# Patient Record
Sex: MALE | Race: WHITE | Employment: UNEMPLOYED | URBAN - NONMETROPOLITAN AREA
[De-identification: names, ages, dates, MRNs, and addresses within clinical notes are randomized per-mention and may not be internally consistent; named-entity substitution may affect disease eponyms.]

---

## 2019-08-03 ENCOUNTER — HOSPITAL ENCOUNTER (EMERGENCY)
Facility: OTHER | Age: 59
Discharge: ANOTHER HOSPITAL | End: 2019-08-03
Attending: FAMILY MEDICINE | Admitting: FAMILY MEDICINE
Payer: COMMERCIAL

## 2019-08-03 ENCOUNTER — APPOINTMENT (OUTPATIENT)
Dept: GENERAL RADIOLOGY | Facility: OTHER | Age: 59
End: 2019-08-03
Attending: FAMILY MEDICINE
Payer: COMMERCIAL

## 2019-08-03 ENCOUNTER — TRANSFERRED RECORDS (OUTPATIENT)
Dept: HEALTH INFORMATION MANAGEMENT | Facility: OTHER | Age: 59
End: 2019-08-03

## 2019-08-03 ENCOUNTER — HOSPITAL ENCOUNTER (EMERGENCY)
Facility: OTHER | Age: 59
Discharge: ANOTHER HOSPITAL | End: 2019-08-03
Admitting: FAMILY MEDICINE
Payer: COMMERCIAL

## 2019-08-03 VITALS
RESPIRATION RATE: 20 BRPM | SYSTOLIC BLOOD PRESSURE: 151 MMHG | OXYGEN SATURATION: 97 % | HEIGHT: 70 IN | BODY MASS INDEX: 31.92 KG/M2 | DIASTOLIC BLOOD PRESSURE: 97 MMHG | TEMPERATURE: 97.5 F | HEART RATE: 72 BPM | WEIGHT: 223 LBS

## 2019-08-03 VITALS
HEIGHT: 70 IN | SYSTOLIC BLOOD PRESSURE: 130 MMHG | HEART RATE: 69 BPM | BODY MASS INDEX: 31.78 KG/M2 | RESPIRATION RATE: 11 BRPM | DIASTOLIC BLOOD PRESSURE: 78 MMHG | OXYGEN SATURATION: 98 % | WEIGHT: 222 LBS

## 2019-08-03 DIAGNOSIS — T17.908A ASPIRATION OF FOREIGN BODY, INITIAL ENCOUNTER: ICD-10-CM

## 2019-08-03 PROCEDURE — 99285 EMERGENCY DEPT VISIT HI MDM: CPT | Mod: 25 | Performed by: FAMILY MEDICINE

## 2019-08-03 PROCEDURE — 99285 EMERGENCY DEPT VISIT HI MDM: CPT | Performed by: FAMILY MEDICINE

## 2019-08-03 PROCEDURE — 74019 RADEX ABDOMEN 2 VIEWS: CPT | Mod: TC

## 2019-08-03 PROCEDURE — 71045 X-RAY EXAM CHEST 1 VIEW: CPT | Mod: TC

## 2019-08-03 PROCEDURE — 99285 EMERGENCY DEPT VISIT HI MDM: CPT | Mod: Z6 | Performed by: FAMILY MEDICINE

## 2019-08-03 ASSESSMENT — ENCOUNTER SYMPTOMS
STRIDOR: 1
APNEA: 0
SHORTNESS OF BREATH: 1
CHOKING: 0
NEUROLOGICAL NEGATIVE: 1
CHEST TIGHTNESS: 0
GASTROINTESTINAL NEGATIVE: 1
CONSTITUTIONAL NEGATIVE: 1
MUSCULOSKELETAL NEGATIVE: 1
COUGH: 1
WHEEZING: 0
NERVOUS/ANXIOUS: 1

## 2019-08-03 ASSESSMENT — MIFFLIN-ST. JEOR
SCORE: 1862.77
SCORE: 1838.24

## 2019-08-03 NOTE — ED NOTES
Pt denies pain, but cringes when deep breathes or coughs.  Cooperative. EMS arrives to transfer pt

## 2019-08-03 NOTE — ED NOTES
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

## 2019-08-03 NOTE — ED NOTES
Contacted mother, Mariana at [phone] and let her know that he will be going to [facility].

## 2019-08-03 NOTE — ED PROVIDER NOTES
"  History   No chief complaint on file.    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 Patient states had tack in mouth and accidentally inhaled it.  Patient had immediate hemoptysis and pain . Very anxious upon EMS arrival Received 3 mg versed. Direct visualization byE MS attempted but only found copious blood in the trachea.  Upon arrival patient groggy from versed. Unable to exhale .     Allergies:  No Known Allergies    Problem List:    There are no active problems to display for this patient.       Past Medical History:    No past medical history on file.    Past Surgical History:    No past surgical history on file.    Family History:    No family history on file.    Social History:  Marital Status:  Single [1]  Social History     Tobacco Use     Smoking status: Not on file   Substance Use Topics     Alcohol use: Not on file     Drug use: Not on file        Medications:      No current outpatient medications on file.      Review of Systems   Constitutional: Negative.    Respiratory: Positive for cough, shortness of breath and stridor. Negative for apnea, choking, chest tightness and wheezing.    Gastrointestinal: Negative.    Musculoskeletal: Negative.    Neurological: Negative.    Psychiatric/Behavioral: The patient is nervous/anxious.        Physical Exam   BP: 123/87  Pulse: 69  Heart Rate: 68  Resp: 11  Height: 177.8 cm (5' 10\")  Weight: 100.7 kg (222 lb)  SpO2: 98 %      Physical Exam   Constitutional: He is oriented to person, place, and time. He appears well-developed and well-nourished. No distress.   Neck: No JVD present. No tracheal deviation present.   Cardiovascular: Normal rate and regular rhythm.   Pulmonary/Chest: No respiratory distress. He has wheezes.   Marked diminished breath sounds right with tight wheeze. Patient unable to exhale .    Abdominal: Soft. Bowel sounds are normal. "   Lymphadenopathy:     He has no cervical adenopathy.   Neurological: He is alert and oriented to person, place, and time.   Skin: Skin is warm and dry. He is not diaphoretic.   Nursing note and vitals reviewed.      ED Course        Procedures          Patient presents to ER with probable foreign body aspiration. Patient works    in construction and accidentally aspirated a tack . Patient agitated on EMS arrival with episode of hemoptysis. Versed given. Direct visualization showing blood in trachea but no foreign body. Patient triaged to exam room. Patient anxious in appearing . Shallow breathing . Pain localized to mid upper chest . Patient without hypoxia or accessory muscle use. NO audible stridor. Oxygen placed for oximetry. Initial portable xray chest and abdomen unable to visualize radioopaque foreign body . Given patient symptoms despite negative xray need to assume foreign body in airway , suspect near right mainstem bronchus. Consult with our surgeon DR Olivares who does not have means for bronchoscopy . Patient clinically stable so will transfer to [facility] accepting physician DR Diaz as pulmonology is available there to do bronchoscopy as needed. Patient understands indication for transfer. Patient transferred by EMS in stable condition .   Results for orders placed or performed during the hospital encounter of 08/03/19   XR Chest Port 1 View    Narrative    EXAM:   XR Chest, 1 View     EXAM DATE/TIME:   8/3/2019 1:45 AM     CLINICAL HISTORY:   59 years old, male; Other: Forgein body; Additional info: SOB - patient had a   thumb tack in his mouth and accidentally swallowed it. Patient states he feels   it in his throat. Patient states he was trying to remove it by sticking a   twizers in this throat. EMS states that they did do a scope and he does have   blood in his trachea.     TECHNIQUE:   Imaging protocol: XR of the chest, 1 view.     COMPARISON:   No relevant prior studies available.     FINDINGS:    Tubes, catheters and devices: Overlying EKG leads.   Lungs: Poor inspiratory effort with somewhat diminished lung volumes. No acute   focal dense air space consolidation or lung parenchymal mass.   Pleural space: No pneumothorax. No large right pleural effusion. No large left   pleural effusion.   Heart/Mediastinum: Unremarkable cardiac silhouette. No mediastinal mass. No   abnormal radiopaque foreign body.  Bones/joints: No acute fracture or neoplastic osseous lesion.       Impression    IMPRESSION:   1. No active cardiopulmonary disaese.   2. No abnormal radiopaque foreign body.  Remainder of findings described as above.    THIS DOCUMENT HAS BEEN ELECTRONICALLY SIGNED BY SIMRAN HOOD MD   XR Abdomen 2 Views    Narrative    EXAM:   XR Abdomen, 2 Views     EXAM DATE/TIME:   8/3/2019 1:47 AM     CLINICAL HISTORY:   59 years old, male; Other: Forgein body; Additional info: Foreign body- patient   had a thumb tack in his mouth and accidentally swallowed it. Patient states he   feels it in his throat. Patient states he was trying to remove it by sticking a   twizers in this throat. EMS states that they did do a scope and he does have   blood in his trachea.     TECHNIQUE:   Imaging protocol: Frontal view of the abdomen/pelvis with upright view of the   abdomen.     COMPARISON:   No relevant prior studies available.     FINDINGS: Overlying EKG leads.  Gastrointestinal tract: Nonspecific bowel gas pattern present. No bowel   obstruction. No bowel wall pneumatosis.   Intraperitoneal space: No pneumoperitoneum.   Organs: No abnormal mass or organomegaly suspected.   Bones/joints: Lumbar spine levoscoliosis. No acute fracture or neoplastic   osseous lesion.   Soft tissues: No abnormal radiopaque foreign body.       Impression    IMPRESSION:   1. No abnormal radiopaque foreign body.  2. Remainder of findings described as above.     THIS DOCUMENT HAS BEEN ELECTRONICALLY SIGNED BY SIMRAN HOOD MD      No results found for  this or any previous visit (from the past 24 hour(s)).    Medications - No data to display    Assessments & Plan (with Medical Decision Making)     I have reviewed the nursing notes.    I have reviewed the findings, diagnosis, plan and need for follow up with the patient.       Medication List      There are no discharge medications for this visit.         Final diagnoses:   Aspiration of foreign body, initial encounter       8/3/2019   [facility]     Glory Reis MD  08/03/19 0244       Glory Reis MD  08/03/19 0244

## 2019-08-03 NOTE — ED TRIAGE NOTES
Patient had a thumb tack in his mouth and accidentally swallowed it.  Patient states he feels it in his throat.  Patient states he was trying to remove it by sticking a twizers in this throat.  EMS states that they did do a scope and he does have blood in his trachea.  Patient was very anxious so they did give versed 3 mg

## 2019-08-06 NOTE — ED PROVIDER NOTES
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 Immediate hemoptysis  Pain localizing to upper sternum. SOB . When EMS arrived patient very agitated . Versed 2 mg given . ATtempt for direct visualization showing blood in trachea only .     Allergies:  Allergies no known allergies    Problem List:    There are no active problems to display for this patient.       Past Medical History:    History reviewed. No pertinent past medical history.    Past Surgical History:    History reviewed. No pertinent surgical history.    Family History:    History reviewed. No pertinent family history.    Social History:  Marital Status:    Social History     Tobacco Use     Smoking status: Never Smoker     Smokeless tobacco: Never Used   Substance Use Topics     Alcohol use: None     Drug use: None        Medications:      No current outpatient medications on file.      Review of Systems   Unable to perform ROS: Acuity of condition       Physical Exam   BP: (!) 151/97  Pulse: 72  Temp: 97.5  F (36.4  C)  Resp: 20  Height: 177.8 cm (5' 10\")  Weight: 101.2 kg (223 lb)  SpO2: 97 %      Physical Exam   Constitutional: He is oriented to person, place, and time. He appears well-developed and well-nourished. He appears distressed.   Anxious appearing male    HENT:   Head: Normocephalic and atraumatic.   Eyes: Pupils are equal, round, and reactive to light.   Neck: Normal range of motion. Neck supple. No tracheal deviation present.   Cardiovascular: Normal rate, regular rhythm and normal heart sounds.   Pulmonary/Chest: No stridor. He is in respiratory distress. He has wheezes. He has no rales.   Patient with shallow respirations. Inability to exhale without cough. Right lung field tight expiratory " wheeze    Abdominal: Soft. Bowel sounds are normal. He exhibits no distension and no mass. There is no tenderness. There is no rebound and no guarding.   Neurological: He is alert and oriented to person, place, and time.   Skin: Skin is warm. Capillary refill takes less than 2 seconds.   Psychiatric:   Anxious male    Nursing note and vitals reviewed.      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 Patient with immediate hemoptysis but no hemoptysis upon arrival to ER . Patient very agitated with first EMS encounter, Versed given in route with some improvement. On initial exam patient somewhat restless complaining of upper sternal discomfort. SOB. Vital signs without significant hypoxia but patient with tight expiratory wheeze and decreased breath sounds right side. Unable to exhale without cough. Xrays not showing any radioopaque foreign body but given exam and history need to suspect foreign body in airway . Consult with our surgeon DR Olivares who does not have capability for bronchoscopy  . Consult to [facility], pulmonology available. Patient currently stable . Patient transferred to [facility] by EMS for further evaluation and management of probable foreign body in airway .   No results found for this or any previous visit.        No results found for this or any previous visit (from the past 24 hour(s)).    Medications - No data to display    Assessments & Plan (with Medical Decision Making)     I have reviewed the nursing notes.    I have reviewed the findings, diagnosis, plan and need for follow up with the patient.         Medication List      There are no discharge medications for this visit.         Final diagnoses:   Aspiration of foreign body, initial encounter       8/3/2019   [facility] Glory Bishop MD  08/05/19 1937

## 2020-01-14 ENCOUNTER — HOSPITAL ENCOUNTER (EMERGENCY)
Facility: OTHER | Age: 60
Discharge: HOME | End: 2020-01-14
Attending: FAMILY MEDICINE
Payer: COMMERCIAL

## 2020-01-14 VITALS
BODY MASS INDEX: 33.52 KG/M2 | DIASTOLIC BLOOD PRESSURE: 94 MMHG | TEMPERATURE: 97.3 F | RESPIRATION RATE: 22 BRPM | WEIGHT: 234.13 LBS | SYSTOLIC BLOOD PRESSURE: 168 MMHG | HEIGHT: 70 IN | OXYGEN SATURATION: 98 %

## 2020-01-14 RX ORDER — ALBUTEROL SULFATE 90 UG/1
2 AEROSOL, METERED RESPIRATORY (INHALATION)
COMMUNITY
Start: 2017-12-22

## 2020-01-14 RX ORDER — IPRATROPIUM BROMIDE AND ALBUTEROL SULFATE 2.5; .5 MG/3ML; MG/3ML
3 SOLUTION RESPIRATORY (INHALATION)
COMMUNITY
Start: 2017-12-22

## 2020-01-14 RX ORDER — GABAPENTIN 300 MG/1
300 CAPSULE ORAL
COMMUNITY
Start: 2015-11-24

## 2020-01-14 RX ORDER — NAPROXEN 500 MG/1
500 TABLET ORAL
COMMUNITY
Start: 2016-12-16 | End: 2020-01-14

## 2020-01-14 RX ORDER — IBUPROFEN 400 MG/1
400 TABLET, FILM COATED ORAL
COMMUNITY
Start: 2017-12-22

## 2020-01-14 RX ORDER — ASCORBIC ACID 500 MG
500 TABLET ORAL
COMMUNITY

## 2020-01-14 ASSESSMENT — MIFFLIN-ST. JEOR: SCORE: 1893.25

## 2020-01-15 NOTE — ED TRIAGE NOTES
Pt reports he's really sick, he's had a cold for about 2 weeks now and he's not getting better.  Hx of hernia surgery and he can feel his hernia coming out so he's wearing a belt, also has back pain from coughing, feels like he's tearing muscles.  He hasn't been sleeping.

## 2020-01-23 ENCOUNTER — HOSPITAL ENCOUNTER (EMERGENCY)
Facility: OTHER | Age: 60
Discharge: HOME | End: 2020-01-23
Attending: EMERGENCY MEDICINE | Admitting: EMERGENCY MEDICINE
Payer: COMMERCIAL

## 2020-01-23 VITALS
WEIGHT: 235 LBS | RESPIRATION RATE: 18 BRPM | TEMPERATURE: 98.6 F | HEIGHT: 70 IN | BODY MASS INDEX: 33.64 KG/M2 | SYSTOLIC BLOOD PRESSURE: 172 MMHG | OXYGEN SATURATION: 96 % | HEART RATE: 77 BPM | DIASTOLIC BLOOD PRESSURE: 91 MMHG

## 2020-01-23 DIAGNOSIS — S61.216A LACERATION OF RIGHT LITTLE FINGER WITH TENDON INVOLVEMENT: ICD-10-CM

## 2020-01-23 DIAGNOSIS — S61.214A LACERATION OF RIGHT RING FINGER WITH TENDON INVOLVEMENT, INITIAL ENCOUNTER: ICD-10-CM

## 2020-01-23 PROCEDURE — 99283 EMERGENCY DEPT VISIT LOW MDM: CPT | Mod: 25 | Performed by: EMERGENCY MEDICINE

## 2020-01-23 PROCEDURE — 25000125 ZZHC RX 250: Performed by: EMERGENCY MEDICINE

## 2020-01-23 PROCEDURE — 12002 RPR S/N/AX/GEN/TRNK2.6-7.5CM: CPT | Mod: Z6 | Performed by: EMERGENCY MEDICINE

## 2020-01-23 PROCEDURE — 12002 RPR S/N/AX/GEN/TRNK2.6-7.5CM: CPT | Performed by: EMERGENCY MEDICINE

## 2020-01-23 PROCEDURE — 99283 EMERGENCY DEPT VISIT LOW MDM: CPT | Mod: Z6 | Performed by: EMERGENCY MEDICINE

## 2020-01-23 RX ORDER — CEPHALEXIN 500 MG/1
500 CAPSULE ORAL 4 TIMES DAILY
Qty: 30 CAPSULE | Refills: 0 | Status: SHIPPED | OUTPATIENT
Start: 2020-01-23 | End: 2020-01-28

## 2020-01-23 RX ORDER — LIDOCAINE HYDROCHLORIDE 10 MG/ML
10 INJECTION, SOLUTION INFILTRATION; PERINEURAL ONCE
Status: COMPLETED | OUTPATIENT
Start: 2020-01-23 | End: 2020-01-23

## 2020-01-23 RX ADMIN — LIDOCAINE HYDROCHLORIDE 10 ML: 10 INJECTION, SOLUTION EPIDURAL; INFILTRATION; INTRACAUDAL; PERINEURAL at 22:10

## 2020-01-23 ASSESSMENT — MIFFLIN-ST. JEOR: SCORE: 1897.2

## 2020-01-23 NOTE — ED AVS SNAPSHOT
[facility]  [address]  [place] [address]  Phone:  [phone]  Fax:  [phone]                                    Varghese Fournier   MRN: [id number]    Department:  [facility]   Date of Visit:  1/23/2020           After Visit Summary Signature Page    I have received my discharge instructions, and my questions have been answered. I have discussed any challenges I see with this plan with the nurse or doctor.    ..........................................................................................................................................  Patient/Patient Representative Signature      ..........................................................................................................................................  Patient Representative Print Name and Relationship to Patient    ..................................................               ................................................  Date                                   Time    ..........................................................................................................................................  Reviewed by Signature/Title    ...................................................              ..............................................  Date                                               Time          22EPIC Rev 08/18

## 2020-01-24 ENCOUNTER — OFFICE VISIT (OUTPATIENT)
Dept: ORTHOPEDICS | Facility: OTHER | Age: 60
End: 2020-01-24
Attending: EMERGENCY MEDICINE
Payer: COMMERCIAL

## 2020-01-24 DIAGNOSIS — S61.216A LACERATION OF RIGHT LITTLE FINGER WITH TENDON INVOLVEMENT: ICD-10-CM

## 2020-01-24 DIAGNOSIS — S61.214A LACERATION OF RIGHT RING FINGER WITH TENDON INVOLVEMENT, INITIAL ENCOUNTER: ICD-10-CM

## 2020-01-24 PROCEDURE — G0463 HOSPITAL OUTPT CLINIC VISIT: HCPCS

## 2020-01-24 NOTE — ED PROVIDER NOTES
[facility]  Emergency Department Visit Note    Hand Injury      History of Present Illness     HPI:  Varghese Fournier is a 59 year old male presenting with laceration of his right ring and little fingers. This was sustained 2 hours ago. The mechanism of injury was cut on a knife. The patient has has low suspicion of foreign body in the wound. The wound was cleaned thoroughly prior to presentation to the emergency department. Tetanus is up to date. There is no numbness, tingling, weakness.    Medications:  Prior to Admission medications    Medication Sig Last Dose Taking? Auth Provider   cephALEXin (KEFLEX) 500 MG capsule Take 1 capsule (500 mg) by mouth 4 times daily for 5 days  Yes Sharon Dobson MD   albuterol (PROAIR HFA/PROVENTIL HFA/VENTOLIN HFA) 108 (90 Base) MCG/ACT inhaler Inhale 2 puffs into the lungs   Reported, Patient   gabapentin (NEURONTIN) 300 MG capsule Take 300 mg by mouth   Reported, Patient   ibuprofen (ADVIL/MOTRIN) 400 MG tablet Take 400 mg by mouth   Reported, Patient   ipratropium - albuterol 0.5 mg/2.5 mg/3 mL (DUONEB) 0.5-2.5 (3) MG/3ML neb solution 3 mLs   Reported, Patient   vitamin C (ASCORBIC ACID) 500 MG tablet Take 500 mg by mouth   Reported, Patient       Allergies:  No Known Allergies    Problem List:  There is no problem list on file for this patient.      Past Medical History:  History reviewed. No pertinent past medical history.    Past Surgical History:  History reviewed. No pertinent surgical history.    Social History:  Social History     Tobacco Use     Smoking status: Former Smoker     Smokeless tobacco: Former User   Substance Use Topics     Alcohol use: Not Currently     Drug use: None       Review of Systems:  10 point review of systems obtained and pertinent positive and negative findings noted in HPI. Review of systems otherwise negative.      Physical Exam     Vital signs: BP (!) 172/91   Pulse 77   Temp 98.6  F (37  C) (Tympanic)   Resp  "18   Ht 1.778 m (5' 10\")   Wt 106.6 kg (235 lb)   SpO2 96%   BMI 33.72 kg/m      Physical Exam:    General: awake and alert, comfortable  Extremities:laceratin ot PIP joints on palmar aspect of rightring and little fingers. He has no flexor tendon fucntion in these fingers. Sensation is intact  Skin: 1.5 cm lacerations to the myrick aspects of the right ting and little finger, explored to depth without sign of foreign body but he does have tendon lacerations of both fingers      Medical Decision Making & ED Course     Varghese Fournier is a 59year old male presenting with laceration of his right little and ring fingers with flexor tendon lacerations. Based on the history and exam, an x-ray was bit indicated to evaluate for retained foreign body. The laceration was explored to its full depth and there is low suspicion of significant injury to underlying soft tissue or bony structures. Tetanus did not require updating. The laceration was repaired as below. The patient was given wound care instructions, including use of antibiotic ointment, cleaning with soap and water, and avoidance of prolonged submersion or dirty environments. The patient does require follow-up tomorrow to discuss flexor tendon repair.  A message has been left with Dr. Winslow's office.  If patient is unable to get in with Dr. Winslow he will return to the emergency department for further evaluation and instruction.  The wounds were loosely approximated and he was placed in a splint.  Repair was done by Boo Can PA-C.  He is placed on Keflex 500 mg p.o. 4 times daily for 5 days    Laceration Repair Procedure Note  Wound Site - right litlle and ring finger  Length in cms - 1.5 cm each  Sensory - Intact to light touch  Motor - Intact  Tendon Injury - No  Anesthetic -8 ml of lidocaine: digital block of both fingers  Irrigation - Performed with saline  Debridement - None  Suture - 4-0 nylon  Number of sutures - 6 and 5  Complications - " None, the patient tolerated this repair well with no complications  This was simple laceration repair meant to loosely approximate the wound for flexor tendon repair in the next day or 2.     Sharon Dobson MD      Diagnosis & Disposition     Diagnosis:  1. Laceration of right ring finger with tendon involvement, initial encounter    2. Laceration of right little finger with tendon involvement        Disposition:  Home    MD Olya Severino Theresa M, MD  01/24/20 0139

## 2020-01-24 NOTE — ED TRIAGE NOTES
"Pt to ER with c/o right 4th and 5th fingers \"almost cut right off\" on a knife. Was using clean serrated knife.   Dressing in place on arrival, blood tinged.  States \"spurting blood\" at home. Occurred 2 hours prior to arrival.  "

## 2020-01-31 NOTE — PROGRESS NOTES
"VITALS:   Height:   70  Weight:   223      CHIEF COMPLAINT: Right Hand Injury, Laceration    PROBLEMS:   Patient has no noted problems.    PATIENT REPORTED MEDICATIONS:   Patient has no noted medications.    Medications were reviewed with patient this visit.    Patient denies being on any medications.    PATIENT REPORTED ALLERGIES:   Patient has no noted allergies.    RISK FACTORS:  Tobacco use:   never smoker  Passive smoke exposure: No  Alcohol Use:   No    HISTORY OF PRESENT ILLNESS:    Werner is a 59-year-old, right-hand dominant simpson who sustained an accidental laceration to the palmar aspect of his right ring and small fingers this morning.    At that time he was stabbing into a piece of wood with a knife when his hand slipped on the blade and he sustained an accidental laceration.   He did not note a substantial amount of bleeding but noted numbness and inability to flex the digits.       PMH:   Health history form dated 01/24/2020 is reviewed and signed.  Past medical history, surgical history, social history, family history, medications, and review of systems is noted and scanned into the chart.     SOCIAL HISTORY:   Marital Status:  Single  Occupation:  Unemployed Simpson  Alcohol Use:  No  Tobacco Use:  Never  History HIV:  No  History Hepatitis:  No   Secondhand Smoke Exposure:  No    PHYSICAL EXAMINATION:    General:    Physical examination reveals a 59-year-old male.  Height: 5' 10\",   Weight: 223 pounds.  The patient is pleasant, alert, oriented x3, appropriate, in no acute distress.    The patient's gait and station are appropriate.  The patient is well groomed, well kempt.  Skin is healthy and intact with no erythema, warmth, rashes, or bruising.   Normal capillary refill.  Pulses are palpable.  Motor is five out of five in all muscle groups tested.   Sensory exam is intact.    Musculoskeletal:        Examination of both upper extremities reveals full and symmetric range of motion of shoulders, " elbows, and wrists.     Examination of the right hand reveals lacerations along the palmar aspect of the right ring and small fingers.   The digits appear viable.   He has decreased two-point discrimination in the ulnar digital nerve of the small finger at greater than one centimeter.  The remaining digits show about five millimeters two-point discrimination, absent flexion proximal or distal digits.    ASSESSMENT:    Zone 2 FDP, FDL S Lacerations, Probable Small Finger Palmar Digital Nerve Laceration    PLAN:   I am unfortunately out of town as of tomorrow so I spoke to Dr. León and I gave Dr. León the patient's phone number.   They will be contacting him to schedule surgery.    Dictated by Oz Winslow M.D.  D:  01/24/2020  T:   01/30/2020    Typed and/or reviewed and corrected by signing  below, and sent to the Physician for final review and signature.     This report was created using voice recording software and computer-generated templates. Although every effort has been made to review for and eliminate errors, some errors may still occur.         Electronically signed by Funmilayo Jimenez  on 01/31/2020 at 8:07 AM    ________________________________________________________________________

## 2022-08-17 NOTE — ED NOTES
Patient stormed out of ER, swearing about the wait time of the ER. Patient was only in room for 30 minutes.    Ipledge Number (Optional): [id number] Detail Level: Zone Anticipated Starting Dosage (Optional): 20mg Daily Patient Reported Weight (Optional - Include Units): 120

## (undated) RX ORDER — LIDOCAINE HYDROCHLORIDE 10 MG/ML
INJECTION, SOLUTION EPIDURAL; INFILTRATION; INTRACAUDAL; PERINEURAL
Status: DISPENSED
Start: 2020-01-23